# Patient Record
Sex: MALE | Race: BLACK OR AFRICAN AMERICAN | NOT HISPANIC OR LATINO | URBAN - METROPOLITAN AREA
[De-identification: names, ages, dates, MRNs, and addresses within clinical notes are randomized per-mention and may not be internally consistent; named-entity substitution may affect disease eponyms.]

---

## 2024-03-09 ENCOUNTER — EMERGENCY (EMERGENCY)
Facility: HOSPITAL | Age: 44
LOS: 0 days | Discharge: HOME | End: 2024-03-09
Attending: EMERGENCY MEDICINE
Payer: MEDICAID

## 2024-03-09 VITALS
HEART RATE: 68 BPM | TEMPERATURE: 98 F | SYSTOLIC BLOOD PRESSURE: 103 MMHG | DIASTOLIC BLOOD PRESSURE: 71 MMHG | RESPIRATION RATE: 17 BRPM | OXYGEN SATURATION: 97 %

## 2024-03-09 VITALS
HEART RATE: 70 BPM | DIASTOLIC BLOOD PRESSURE: 83 MMHG | RESPIRATION RATE: 18 BRPM | SYSTOLIC BLOOD PRESSURE: 122 MMHG | OXYGEN SATURATION: 100 % | TEMPERATURE: 98 F

## 2024-03-09 DIAGNOSIS — S61.412A LACERATION WITHOUT FOREIGN BODY OF LEFT HAND, INITIAL ENCOUNTER: ICD-10-CM
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
## 2024-03-09 PROCEDURE — 99284 EMERGENCY DEPT VISIT MOD MDM: CPT | Mod: 25

## 2024-03-09 PROCEDURE — 12001 RPR S/N/AX/GEN/TRNK 2.5CM/<: CPT

## 2024-03-09 PROCEDURE — 70450 CT HEAD/BRAIN W/O DYE: CPT | Mod: MC

## 2024-03-09 PROCEDURE — 70450 CT HEAD/BRAIN W/O DYE: CPT | Mod: 26,MC

## 2024-03-09 PROCEDURE — 99285 EMERGENCY DEPT VISIT HI MDM: CPT | Mod: 25

## 2024-03-09 PROCEDURE — 73120 X-RAY EXAM OF HAND: CPT | Mod: LT

## 2024-03-09 PROCEDURE — 73120 X-RAY EXAM OF HAND: CPT | Mod: 26,LT

## 2024-03-09 PROCEDURE — 90715 TDAP VACCINE 7 YRS/> IM: CPT

## 2024-03-09 RX ORDER — TETANUS TOXOID, REDUCED DIPHTHERIA TOXOID AND ACELLULAR PERTUSSIS VACCINE, ADSORBED 5; 2.5; 8; 8; 2.5 [IU]/.5ML; [IU]/.5ML; UG/.5ML; UG/.5ML; UG/.5ML
0.5 SUSPENSION INTRAMUSCULAR ONCE
Refills: 0 | Status: COMPLETED | OUTPATIENT
Start: 2024-03-09 | End: 2024-03-09

## 2024-03-09 RX ORDER — CEPHALEXIN 500 MG
500 CAPSULE ORAL
Refills: 0 | Status: DISCONTINUED | OUTPATIENT
Start: 2024-03-09 | End: 2024-03-09

## 2024-03-09 RX ORDER — CEPHALEXIN 500 MG
1 CAPSULE ORAL
Qty: 14 | Refills: 0
Start: 2024-03-09 | End: 2024-03-15

## 2024-03-09 RX ADMIN — Medication 500 MILLIGRAM(S): at 04:44

## 2024-03-09 RX ADMIN — TETANUS TOXOID, REDUCED DIPHTHERIA TOXOID AND ACELLULAR PERTUSSIS VACCINE, ADSORBED 0.5 MILLILITER(S): 5; 2.5; 8; 8; 2.5 SUSPENSION INTRAMUSCULAR at 01:41

## 2024-03-09 NOTE — ED PROVIDER NOTE - OBJECTIVE STATEMENT
Now she done 41-year-old male without significant past medical history presents with left hand laceration and head trauma.  Patient was running from the police and was trying to climb a fence and cut hand.  Patient fell from fence and hit head.  Did not lose consciousness happened approximately 5 hours ago,  unknown last tetanus.

## 2024-03-09 NOTE — ED ADULT NURSE NOTE - NSFALLRISKINTERV_ED_ALL_ED

## 2024-03-09 NOTE — ED ADULT NURSE NOTE - CHIEF COMPLAINT QUOTE
Pt BIB [facility] #[id number] under arrest c/o left hand laceration and b/l arms abrasions s/p climbing fence while running from [facility]. Small laceration noted between fingers. No bleeding present upon arrival. unknown last tetanus shot. Pt able to NGO. Pt has no other complaints at this time. Pt denies PMHx.

## 2024-03-09 NOTE — ED PROVIDER NOTE - PHYSICAL EXAMINATION
GEN - NAD; well appearing; A+O x3  HEAD - NC/AT    EYES - EOMI, no conjunctival pallor, no scleral icterus  ENT -   mucous membranes  moist , no discharge  PULM - CTA b/l,  symmetric breath sounds  COR -  RRR, S1 S2, no murmurs  ABD - ND, NT, soft, no guarding, no rebound, no masses    EXTREMS -Laceration to webspace between second and third digits of left hand 1 cm  SKIN - no rash or bruising      NEUROLOGIC - alert, sensation nl, motor 5/5 RUE/LUE/RLE/LLE

## 2024-03-09 NOTE — ED PROVIDER NOTE - CLINICAL SUMMARY MEDICAL DECISION MAKING FREE TEXT BOX
Patient with hand laceration will repair, update tetanus, antibiotics as it was a dirty fence.  Patient also hit head and was dazed will CT head rule out bleed.    Progress note 2:31 AM CT negative lack repaired okay for discharge.

## 2024-03-09 NOTE — ED ADULT NURSE NOTE - NS ED NURSE RECORD ANOTHER HT AND WT
Emailed Alexis Fortunato's Dad to let him know Fortunato's [place] level is 8.3 (goal 5-10) no med changes needed at this time.    Yes

## 2024-03-09 NOTE — ED ADULT NURSE NOTE - OBJECTIVE STATEMENT
Pt arrived to ED with c/o lacerations to left hand and right wrist. Pt states he was hoping a 6 foot fence when his foot got caught and he fell head first hitting his head on a log and cutting his arms on the fence. Pt states the pain in his left hand is 10/10. Pt AOx4 denies LOC, headache, vision changes, chest pain, sob, or n/v/d.

## 2024-03-09 NOTE — ED PROVIDER NOTE - NSFOLLOWUPINSTRUCTIONS_ED_ALL_ED_FT
Please follow-up in 7 days for suture removal.  You may go to any urgent care.  Please take antibiotics as prescribed. Return to ED if redness swelling fevers or other concerning symptoms.    Patient is fit for confinement.

## 2024-03-09 NOTE — ED PROVIDER NOTE - PATIENT PORTAL LINK FT
You can access the FollowMyHealth Patient Portal offered by [facility] by registering at the following website: [email or web address]. By joining [employer]’s FollowMyHealth portal, you will also be able to view your health information using other applications (apps) compatible with our system.
